# Patient Record
Sex: MALE | URBAN - METROPOLITAN AREA
[De-identification: names, ages, dates, MRNs, and addresses within clinical notes are randomized per-mention and may not be internally consistent; named-entity substitution may affect disease eponyms.]

---

## 2024-04-15 ENCOUNTER — APPOINTMENT (OUTPATIENT)
Dept: PRIMARY CARE | Facility: CLINIC | Age: 29
End: 2024-04-15
Payer: COMMERCIAL

## 2024-05-10 ENCOUNTER — OFFICE VISIT (OUTPATIENT)
Dept: PRIMARY CARE | Facility: CLINIC | Age: 29
End: 2024-05-10
Payer: COMMERCIAL

## 2024-05-10 VITALS
BODY MASS INDEX: 22.44 KG/M2 | OXYGEN SATURATION: 95 % | DIASTOLIC BLOOD PRESSURE: 54 MMHG | HEART RATE: 71 BPM | SYSTOLIC BLOOD PRESSURE: 91 MMHG | WEIGHT: 143 LBS | HEIGHT: 67 IN

## 2024-05-10 DIAGNOSIS — Z00.00 HEALTH CARE MAINTENANCE: Primary | ICD-10-CM

## 2024-05-10 DIAGNOSIS — F41.9 ANXIETY: ICD-10-CM

## 2024-05-10 DIAGNOSIS — R10.10 PAIN OF UPPER ABDOMEN: ICD-10-CM

## 2024-05-10 DIAGNOSIS — R73.9 HYPERGLYCEMIA: ICD-10-CM

## 2024-05-10 PROBLEM — S13.4XXA WHIPLASH INJURY TO NECK: Status: ACTIVE | Noted: 2017-09-25

## 2024-05-10 PROBLEM — K21.9 GERD (GASTROESOPHAGEAL REFLUX DISEASE): Status: ACTIVE | Noted: 2024-04-16

## 2024-05-10 PROCEDURE — 1036F TOBACCO NON-USER: CPT | Performed by: INTERNAL MEDICINE

## 2024-05-10 PROCEDURE — 99385 PREV VISIT NEW AGE 18-39: CPT | Performed by: INTERNAL MEDICINE

## 2024-05-10 RX ORDER — HYDROCORTISONE ACETATE PRAMOXINE HCL 2.5; 1 G/100G; G/100G
1 CREAM TOPICAL 2 TIMES DAILY PRN
COMMUNITY
Start: 2024-04-25

## 2024-05-10 NOTE — ASSESSMENT & PLAN NOTE
Unclear etiology.  Continue workup per gastroenterology.  Consider IBS as the diagnosis.  Consider SSRI in the future.  Check A1c due to hypoglycemia

## 2024-05-10 NOTE — PROGRESS NOTES
"Subjective   Patient ID: Familia Adamson is a 29 y.o. male who presents for [facility].          HPI     Patient is a 29-year-old male with past medical history of anxiety presents to [facility] care.  He has been experiencing some abdominal discomfort as well as diarrhea.  He established with gastroenterology after evaluation in the emergency room.  He had a clean colonoscopy.  He plans to have a HIDA scan upcoming and was recently started on analpram HC for treatment of possible hemorrhoids.  He did have some blood on his stool at some point but the colonoscopy was negative as hemoglobin is within normal range.  He does not have a diagnosis of yet occasionally gets abdominal spasms.    Exercises irregularly.  Tries to eat healthfully.  Family lives nearby but not in the same city    Denies illicit substances smoking.  Alcohol occasionally    Review of Systems  Constitutional: No fever or chills, No Night Sweats  Eyes: No Blurry Vision or Eye sight problems  ENT: No Nasal Discharge, Hoarseness, sore throat  Cardiovascular: no chest pain, no palpitations and no syncope.   Respiratory: no cough, no shortness of breath during exertion and no shortness of breath at rest.   Gastrointestinal: no abdominal pain, no nausea and no vomiting.   : No issues with urinary stream, burning with urination, no blood in urine or stools  Skin: No Skin rashes or Lesions  Neuro: No Headache, no dizziness or Numbness or tingling  Psych: No Anxiety, depression or sleeping problems  Heme: No Easy bleeding or brusing.     Objective   BP 91/54   Pulse 71   Ht 1.702 m (5' 7\")   Wt 64.9 kg (143 lb)   SpO2 95%   BMI 22.40 kg/m²     Physical Exam  Constitutional: Alert and in no acute distress. Well developed, well nourished.   Head and Face: Head and face: Normal.    Eyes: Normal external exam. Pupils were equal in size, round, reactive to light (PERRL) with normal accommodation and extraocular movements intact (EOMI).   Ears, Nose, " "Mouth, and Throat: External inspection of ears and nose: Normal.  Hearing: Normal.  Nasal mucosa, septum, and turbinates: Normal.  Lips, teeth, and gums: Normal.  Oropharynx: Normal.   Neck: No neck mass was observed. Supple. Thyroid not enlarged and there were no palpable thyroid nodules.   Cardiovascular: Heart rate and rhythm were normal, normal S1 and S2. Pedal pulses: Normal. No peripheral edema.   Pulmonary: No respiratory distress. Clear bilateral breath sounds.   Abdomen: Soft nontender; no abdominal mass palpated. Normal bowel sounds. No organomegaly.   : Deferred  Musculoskeletal: No joint swelling seen, normal movements of all extremities. Range of motion: Normal.  Muscle strength/tone: Normal.    Skin: Normal skin color and pigmentation, normal skin turgor, and no rash.   Neurologic: Deep tendon reflexes were 2+ and symmetric.   Psychiatric: Judgment and insight: Intact. Mood and affect: Normal.  Lymphatic: No cervical lymphadenopathy. Palpation of lymph nodes in axillae: Normal.  Palpation of lymph nodes in groin: Normal.    No results found for: \"WBC\", \"HGB\", \"HCT\", \"PLT\", \"CHOL\", \"TRIG\", \"HDL\", \"LDLDIRECT\", \"ALT\", \"AST\", \"NA\", \"K\", \"CL\", \"CREATININE\", \"BUN\", \"CO2\", \"TSH\", \"PSA\", \"INR\", \"GLUF\", \"HGBA1C\", \"ALBUR\"    No image results found.      Assessment/Plan   Problem List Items Addressed This Visit             ICD-10-CM    Anxiety F41.9     Stable off medications however can consider SSRI in the future         Health care maintenance - Primary Z00.00    Pain of upper abdomen R10.10     Unclear etiology.  Continue workup per gastroenterology.  Consider IBS as the diagnosis.  Consider SSRI in the future.  Check A1c due to hypoglycemia            Other Visit Diagnoses         Codes    Hyperglycemia     R73.9    Relevant Orders    Hemoglobin A1C              Dear Familia Adamson     It was my pleasure to take care of you today in the office. Below are the things we discussed today:    1. " Immunizations: Yearly Flu shot is recommended.          Patient to have tetanus at a future date wishes to defer at this time    2. Blood Work: Up-to-date  3. Seen your dentist twice a year  4. Yearly Eye exam is recommended    5. BMI: 22.4  6: Diet recommendations:   Eat Clean, Try to have as many home cooked meals as possible  Avoid processed foods which contain excess calories, sugar, and sodium.    7. Exercise recommendations:   150 minutes a week to maintain your weight     If you have to loose weight, you need a better diet and exercise plan.     8. Please get your Living will / Advance directive completed if you do not have one already. Please make sure our office has a copy of the latest one.     9. Colonoscopy: N/A  10. PSA: N/A    11.     Follow up in one year for a Physical. Please call the office before your Physical to see if you need blood work completed prior to your physical.     Please call me if any questions arise from now until your next visit. I will call you after I am done seeing patients. A Doctor is always available by phone when the office is closed. Please feel free to call for help with any problem that you feel shouldn't wait until the office re-opens.     Shiav Elise, DO

## 2024-05-13 ENCOUNTER — APPOINTMENT (OUTPATIENT)
Dept: GASTROENTEROLOGY | Facility: CLINIC | Age: 29
End: 2024-05-13
Payer: COMMERCIAL

## 2024-08-12 ENCOUNTER — APPOINTMENT (OUTPATIENT)
Dept: PRIMARY CARE | Facility: CLINIC | Age: 29
End: 2024-08-12
Payer: COMMERCIAL

## 2024-08-12 VITALS
DIASTOLIC BLOOD PRESSURE: 74 MMHG | WEIGHT: 145 LBS | BODY MASS INDEX: 22.71 KG/M2 | OXYGEN SATURATION: 98 % | HEART RATE: 74 BPM | SYSTOLIC BLOOD PRESSURE: 124 MMHG

## 2024-08-12 DIAGNOSIS — R10.11 RUQ ABDOMINAL PAIN: Primary | ICD-10-CM

## 2024-08-12 PROCEDURE — 1036F TOBACCO NON-USER: CPT | Performed by: INTERNAL MEDICINE

## 2024-08-12 PROCEDURE — 99213 OFFICE O/P EST LOW 20 MIN: CPT | Performed by: INTERNAL MEDICINE

## 2024-08-12 NOTE — PROGRESS NOTES
"Subjective   Patient ID: Familia Adamson is a 29 y.o. male who presents for Follow-up.          HPI     Patient is a 29-year-old male with past medical history of anxiety presents  for follow up     Last visit patient was noted to have an elevated blood sugar.  An A1c was ordered but not completed.    Patient has been having intermittent right upper quadrant abdominal pain.  He had CT scan of the abdomen and pelvis which was unremarkable.  He has had a GI workup at [facility].  He is supposed to have a HIDA scan but was unable to schedule and he was confused about how to schedule it.  He does have intermittent right upper quadrant abdominal pain not currently present.  He wishes to complete the HIDA scan reviewed note from gastroenterologist at the [facility] that stated    \"He has been experiencing some abdominal discomfort as well as diarrhea.  He established with gastroenterology after evaluation in the emergency room.  He had a clean colonoscopy.  He plans to have a HIDA scan upcoming and was recently started on analpram HC for treatment of possible hemorrhoids.  He did have some blood on his stool at some point but the colonoscopy was negative as hemoglobin is within normal range.  He does not have a diagnosis of yet occasionally gets abdominal spasms.\"      Review of Systems  Constitutional: No fever or chills, No Night Sweats  Eyes: No Blurry Vision or Eye sight problems  ENT: No Nasal Discharge, Hoarseness, sore throat  Cardiovascular: no chest pain, no palpitations and no syncope.   Respiratory: no cough, no shortness of breath during exertion and no shortness of breath at rest.   Gastrointestinal: no abdominal pain, no nausea and no vomiting.   : No issues with urinary stream, burning with urination, no blood in urine or stools  Skin: No Skin rashes or Lesions  Neuro: No Headache, no dizziness or Numbness or tingling  Psych: No Anxiety, depression or sleeping problems  Heme: No Easy " "bleeding or brusing.     Objective   /74   Pulse 74   Wt 65.8 kg (145 lb)   SpO2 98%   BMI 22.71 kg/m²     Physical Exam  Constitutional: Alert and in no acute distress. Well developed, well nourished.   Head and Face: Head and face: Normal.    Eyes: Normal external exam. Pupils were equal in size, round, reactive to light (PERRL) with normal accommodation and extraocular movements intact (EOMI).   Ears, Nose, Mouth, and Throat: External inspection of ears and nose: Normal.  Hearing: Normal.  Nasal mucosa, septum, and turbinates: Normal.  Lips, teeth, and gums: Normal.  Oropharynx: Normal.   Neck: No neck mass was observed. Supple. Thyroid not enlarged and there were no palpable thyroid nodules.   Cardiovascular: Heart rate and rhythm were normal, normal S1 and S2. Pedal pulses: Normal. No peripheral edema.   Pulmonary: No respiratory distress. Clear bilateral breath sounds.   Abdomen: Soft nontender; no abdominal mass palpated. Normal bowel sounds. No organomegaly.   : Deferred  Musculoskeletal: No joint swelling seen, normal movements of all extremities. Range of motion: Normal.  Muscle strength/tone: Normal.    Skin: Normal skin color and pigmentation, normal skin turgor, and no rash.   Neurologic: Deep tendon reflexes were 2+ and symmetric.   Psychiatric: Judgment and insight: Intact. Mood and affect: Normal.  Lymphatic: No cervical lymphadenopathy. Palpation of lymph nodes in axillae: Normal.  Palpation of lymph nodes in groin: Normal.    No results found for: \"WBC\", \"HGB\", \"HCT\", \"PLT\", \"CHOL\", \"TRIG\", \"HDL\", \"LDLDIRECT\", \"ALT\", \"AST\", \"NA\", \"K\", \"CL\", \"CREATININE\", \"BUN\", \"CO2\", \"TSH\", \"PSA\", \"INR\", \"GLUF\", \"HGBA1C\", \"ALBUR\"    No image results found.      Assessment/Plan   Problem List Items Addressed This Visit    None  Visit Diagnoses         Codes    RUQ abdominal pain    -  Primary R10.11    Relevant Orders    NM hepatobiliary w cholecystokinin        Please complete your A1c to rule out " diabetes.  Will also order the HIDA scan at the recommendation of the gastroenterologist.  If positive advised follow-up with a gastroenterologist for evaluation and treatment

## 2024-09-18 ENCOUNTER — HOSPITAL ENCOUNTER (OUTPATIENT)
Dept: RADIOLOGY | Facility: CLINIC | Age: 29
Discharge: HOME | End: 2024-09-18
Payer: COMMERCIAL

## 2024-09-18 ENCOUNTER — OFFICE VISIT (OUTPATIENT)
Dept: PRIMARY CARE | Facility: CLINIC | Age: 29
End: 2024-09-18
Payer: COMMERCIAL

## 2024-09-18 VITALS
BODY MASS INDEX: 22.4 KG/M2 | WEIGHT: 143 LBS | OXYGEN SATURATION: 98 % | HEART RATE: 79 BPM | DIASTOLIC BLOOD PRESSURE: 71 MMHG | SYSTOLIC BLOOD PRESSURE: 118 MMHG

## 2024-09-18 DIAGNOSIS — M54.2 NECK PAIN: ICD-10-CM

## 2024-09-18 DIAGNOSIS — M54.50 LOW BACK PAIN, UNSPECIFIED BACK PAIN LATERALITY, UNSPECIFIED CHRONICITY, UNSPECIFIED WHETHER SCIATICA PRESENT: ICD-10-CM

## 2024-09-18 DIAGNOSIS — M54.2 NECK PAIN: Primary | ICD-10-CM

## 2024-09-18 PROCEDURE — 99213 OFFICE O/P EST LOW 20 MIN: CPT | Performed by: INTERNAL MEDICINE

## 2024-09-18 PROCEDURE — 1036F TOBACCO NON-USER: CPT | Performed by: INTERNAL MEDICINE

## 2024-09-18 PROCEDURE — 72050 X-RAY EXAM NECK SPINE 4/5VWS: CPT

## 2024-09-18 PROCEDURE — 72050 X-RAY EXAM NECK SPINE 4/5VWS: CPT | Performed by: RADIOLOGY

## 2024-09-18 RX ORDER — METHOCARBAMOL 500 MG/1
500 TABLET, FILM COATED ORAL 3 TIMES DAILY PRN
Qty: 21 TABLET | Refills: 0 | Status: SHIPPED | OUTPATIENT
Start: 2024-09-18 | End: 2024-09-25
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
## 2024-09-25 ENCOUNTER — HOSPITAL ENCOUNTER (OUTPATIENT)
Dept: RADIOLOGY | Facility: EXTERNAL LOCATION | Age: 29
Discharge: HOME | End: 2024-09-25

## 2024-09-25 DIAGNOSIS — M54.2 NECK PAIN: ICD-10-CM

## 2024-10-02 ENCOUNTER — HOSPITAL ENCOUNTER (OUTPATIENT)
Dept: RADIOLOGY | Facility: EXTERNAL LOCATION | Age: 29
Discharge: HOME | End: 2024-10-02

## 2024-10-02 DIAGNOSIS — R51.9 NONINTRACTABLE HEADACHE, UNSPECIFIED CHRONICITY PATTERN, UNSPECIFIED HEADACHE TYPE: ICD-10-CM

## 2024-10-24 PROBLEM — R51.9 HEADACHE: Status: ACTIVE | Noted: 2024-10-24

## 2024-10-24 NOTE — PROGRESS NOTES
"Physical Therapy    Physical Therapy Evaluation and Treatment      Patient Name: Familia Adamson \"Stefanie"  MRN: [id number]  Today's Date: 10/25/24  Visit 1  Time Entry:   Time Calculation  Start Time: 0945  Stop Time: 1030  Time Calculation (min): 45 min  PT Evaluation Time Entry  PT Evaluation (Low) Time Entry: 20  PT Therapeutic Procedures Time Entry  Manual Therapy Time Entry: 15  Therapeutic Exercise Time Entry: 10                   Assessment:    Patient presents with clinical signs and symptoms consistent with cervical strain with R UE radicular symptom in the  C 6  distribution. These impairments affect ADLs, work, recreational activity, exercise, transfer ability, and sleep function that requires skilled PT intervention to resolve and enable patient to return to previous level of function. Factors that may affect progress in PT is and patient compliance.  Patient response to initial treatment of  Strain counter strain technique to reduce muscle hypertonus and UQ stretching was understood by Michael Adamson.          Plan:  OP PT Plan  Treatment/Interventions: Dry needling, Cryotherapy, Education/ Instruction, Manual therapy, Neuromuscular re-education, Self care/ home management, Taping techniques, Therapeutic exercises, Therapeutic activities  PT Plan: Skilled PT  PT Frequency: 1 time per week  Duration: 8  Onset Date: 08/01/24  Certification Period Start Date: 10/25/24  Certification Period End Date: 01/23/25  Rehab Potential: Excellent  Plan of Care Agreement: Patient    Current Problem:   1. Headache  Follow Up In Physical Therapy      2. Neck pain  Referral to Physical Therapy    Follow Up In Physical Therapy      3. Low back pain, unspecified back pain laterality, unspecified chronicity, unspecified whether sciatica present  Referral to Physical Therapy          Subjective    Michael Adamson c/o cervical pain and R UE radicular pain . He has had HA s in past not lately. Preferred mode of exercise walking "        Pain:   4-5/10  Home Living:     Prior Level of Function:   Works as  [employer]  Objective          Observation:    Shoulder joint clearance: clear    Cervical AROM : Flex 100  %    , Extension  100  % P central ,   Sidebending  R 90 % P  L  90% P  , Rotation  R  70 Pdeg  L  80deg  Rotation R Peripheralizes         Myotomal Strength:  C4   R 5/5   L 5/5,  C5  R  5/5  L  5/5, C6  R  3+/5  L 5 /5,  C7   R  /5  L  /5  C8   R  /5  L  /5  T1   R  /5  L  /5            Sensation: R C6 distribution dorsal forearm    Segmental cervical  joint mobility: clear  Palpation:  upper quarter hypertonus including R cervical paraspinals,  R levator scapulae,  UT , anterior scalene,  R pec minor,  ,                     R rectus capitus,                          Special Tests:   Spurling's      -  ,  Vertical compression    -,  Distraction  centralizes                                  Treatments:  There ex:  - Gentle Levator Scapulae Stretch  - 3 x daily - 7 x weekly - 30 reps  - Doorway Pec Stretch at 90 Degrees Abduction  - 3 x daily - 7 x weekly - 3 reps - 30 hold  Manual:  Strain counter strain technique to reduce muscle hypertonus of R rectus capitus, R levator, and R pec minor  EDUCATION:     extensive education regarding mechanism of injury, relevant functional anatomy, treatment program rational, self management, HEP, and POC   Access Code: [email or web address]  URL: [email or web address]  Date: 10/25/2024  Prepared by: Leo Melvin    Exercises  - Gentle Levator Scapulae Stretch  - 3 x daily - 7 x weekly - 30 reps  - Doorway Pec Stretch at 90 Degrees Abduction  - 3 x daily - 7 x weekly - 3 reps - 30 hold  Goals:  1. Centralize R UE symptom  2. Full pain free AROM cervical spine  3. Reduce R UQ TP pain severity and hypertonus  4. Michael Adamson will demonstrate ability to press 20 lbs over head R and L arm and carry 30 lbs 30 feet without aggravating familiar pain  5. independent Home exercise  program

## 2024-10-25 ENCOUNTER — EVALUATION (OUTPATIENT)
Dept: PHYSICAL THERAPY | Facility: CLINIC | Age: 29
End: 2024-10-25
Payer: COMMERCIAL

## 2024-10-25 DIAGNOSIS — M54.50 LOW BACK PAIN, UNSPECIFIED BACK PAIN LATERALITY, UNSPECIFIED CHRONICITY, UNSPECIFIED WHETHER SCIATICA PRESENT: ICD-10-CM

## 2024-10-25 DIAGNOSIS — R51.9 HEADACHE: Primary | ICD-10-CM

## 2024-10-25 DIAGNOSIS — M54.2 NECK PAIN: ICD-10-CM

## 2024-10-25 PROCEDURE — 97110 THERAPEUTIC EXERCISES: CPT | Mod: GP | Performed by: PHYSICAL THERAPIST

## 2024-10-25 PROCEDURE — 97140 MANUAL THERAPY 1/> REGIONS: CPT | Mod: GP | Performed by: PHYSICAL THERAPIST

## 2024-10-25 PROCEDURE — 97161 PT EVAL LOW COMPLEX 20 MIN: CPT | Mod: GP | Performed by: PHYSICAL THERAPIST

## 2024-11-14 PROBLEM — M54.2 NECK PAIN: Status: ACTIVE | Noted: 2024-11-14

## 2024-11-14 PROBLEM — M54.50 LOW BACK PAIN: Status: ACTIVE | Noted: 2024-11-14

## 2024-11-14 NOTE — PROGRESS NOTES
"Physical Therapy    Physical Therapy Evaluation and Treatment      Patient Name: Familia Adamson \"Stefanie"  MRN: [id number]  Today's Date: 11/15/24  Visit 2  Time Entry:   Time Calculation  Start Time: 0900  Stop Time: 0940  Time Calculation (min): 40 min     PT Therapeutic Procedures Time Entry  Manual Therapy Time Entry: 25  Therapeutic Exercise Time Entry: 15                   Assessment:  Michael Adamson reported significant L UQ pain and muscle tone reduction post treatment. He is modifying his weight training program to minimize over head pressing and any exercise that aggravates his pain.        Plan:   Michael Adamson will bring his fiancee in to learn Strain counter strain technique to reduce muscle hypertonus     Current Problem:   Problem List Items Addressed This Visit             ICD-10-CM    Episodic tension-type headache, not intractable - Primary G44.219    Neck pain M54.2    Low back pain M54.50        Subjective    Michael Adamson notes that his neck pain and R UE radicular symptom is less frequent and less severe     Pain:   5/10  Home Living:     Prior Level of Function:   Works as  [employer]  Objective          Observation:    + L UQ Tps as at initial visit                               Treatments:  There ex:  - Gentle Levator Scapulae Stretch  - 3 x daily - 7 x weekly - 30 reps  - Doorway Pec Stretch at 90 Degrees Abduction  - 3 x daily - 7 x weekly - 3 reps - 30 hold  - Seated Assisted Cervical Rotation with Towel  - 1 x daily - 7 x weekly - 3 sets - 10 reps - 30 hold  - Mid-Lower Cervical Extension SNAG with Strap  - 1 x daily - 7 x weekly - 3 sets - 10 reps - 30 hold  Manual:  Strain counter strain technique to reduce muscle hypertonus of R UT  rectus capitus, R levator, anterior scalene, and R pec minor  MET cervical Sbing L at C5-6    EDUCATION:     Access Code: [email or web address]  URL: [email or web address]  Date: 11/15/2024  Prepared by: Leo Phelps   " Seated Assisted Cervical Rotation with Towel  - 1 x daily - 7 x weekly - 3 sets - 10 reps - 30 hold  - Mid-Lower Cervical Extension SNAG with Strap  - 1 x daily - 7 x weekly - 3 sets - 10 reps - 30 hold  Goals:  1. Centralize R UE symptom  2. Full pain free AROM cervical spine  3. Reduce R UQ TP pain severity and hypertonus  4. Michael Adamson will demonstrate ability to press 20 lbs over head R and L arm and carry 30 lbs 30 feet without aggravating familiar pain  5. independent Home exercise program

## 2024-11-15 ENCOUNTER — TREATMENT (OUTPATIENT)
Dept: PHYSICAL THERAPY | Facility: CLINIC | Age: 29
End: 2024-11-15
Payer: COMMERCIAL

## 2024-11-15 DIAGNOSIS — M54.2 NECK PAIN: ICD-10-CM

## 2024-11-15 DIAGNOSIS — G44.219 EPISODIC TENSION-TYPE HEADACHE, NOT INTRACTABLE: ICD-10-CM

## 2024-11-15 DIAGNOSIS — R51.9 HEADACHE: Primary | ICD-10-CM

## 2024-11-15 DIAGNOSIS — M54.50 LOW BACK PAIN, UNSPECIFIED BACK PAIN LATERALITY, UNSPECIFIED CHRONICITY, UNSPECIFIED WHETHER SCIATICA PRESENT: ICD-10-CM

## 2024-11-15 PROCEDURE — 97140 MANUAL THERAPY 1/> REGIONS: CPT | Mod: GP | Performed by: PHYSICAL THERAPIST

## 2024-11-15 PROCEDURE — 97110 THERAPEUTIC EXERCISES: CPT | Mod: GP | Performed by: PHYSICAL THERAPIST

## 2024-11-21 ENCOUNTER — TREATMENT (OUTPATIENT)
Dept: PHYSICAL THERAPY | Facility: CLINIC | Age: 29
End: 2024-11-21
Payer: COMMERCIAL

## 2024-11-21 DIAGNOSIS — M54.50 LOW BACK PAIN, UNSPECIFIED BACK PAIN LATERALITY, UNSPECIFIED CHRONICITY, UNSPECIFIED WHETHER SCIATICA PRESENT: ICD-10-CM

## 2024-11-21 DIAGNOSIS — M54.2 NECK PAIN: Primary | ICD-10-CM

## 2024-11-21 DIAGNOSIS — G44.219 EPISODIC TENSION-TYPE HEADACHE, NOT INTRACTABLE: ICD-10-CM

## 2024-11-21 NOTE — PROGRESS NOTES
"Physical Therapy                 Therapy Communication Note    Patient Name: Familia Adamson \"Michael\"  MRN: [id number]  Department:   Room: Room/bed info not found  Today's Date: 11/21/2024     Discipline: Physical Therapy    Missed Visit Reason:      Missed Time: No Show    Comment:  "

## 2024-11-29 PROBLEM — R51.9 HEADACHE: Status: ACTIVE | Noted: 2024-11-29

## 2024-11-29 NOTE — PROGRESS NOTES
"Physical Therapy    Physical Therapy  Treatment      Patient Name: Familia Adamson \"Michael\"  MRN: [id number]  Today's Date: 12/2/24  Visit 3  Time Entry:   Time Calculation  Start Time: 0930  Stop Time: 1010  Time Calculation (min): 40 min     PT Therapeutic Procedures Time Entry  Manual Therapy Time Entry: 25  Therapeutic Exercise Time Entry: 15                   Assessment:  Michael Adamson has no more HA, his neck pain is less severe, but he does still have anterior R shoulder pain and occasional bilat UE distal radicular symptom L > R  He has deep neck flexor weakness so we addressed that with a strengthening exercise      Plan:  Continue to work on resolving UQ hypertonus    Current Problem:   Problem List Items Addressed This Visit             ICD-10-CM    Episodic tension-type headache, not intractable - Primary G44.219    Neck pain M54.2    Low back pain M54.50    Headache R51.9        Subjective    Michael Adamson  feels that his neck is better, but now his anterior L shoulder pain is the most prominent pain. UE radicular symptom  is resolving  Pain:   5/10         Objective          Observation:    + L UQ Tps as at initial visit                               Treatments:  There ex:   Deep neck flexor strengthening 10x 10 sec holds  - Gentle Levator Scapulae Stretch  - 3 x daily - 7 x weekly - 30 reps  - Doorway Pec Stretch at 90 Degrees Abduction  - 3 x daily - 7 x weekly - 3 reps - 30 hold  - Seated Assisted Cervical Rotation with Towel  - 1 x daily - 7 x weekly - 3 sets - 10 reps - 30 hold  - Mid-Lower Cervical Extension SNAG with Strap  - 1 x daily - 7 x weekly - 3 sets - 10 reps - 30 hold  Manual:  Strain counter strain technique to reduce muscle hypertonus of bilat UT  rectus capitus,  levator, anterior scalene, and bilat pec minor      EDUCATION:       Goals:Access Code: [email or web address]  URL: [email or web address]  Date: 12/02/2024  Prepared by: Leo Melvin    Exercises  - Supine Head Nod " with Deep Neck Flexor Activation  - 1 x daily - 7 x weekly - 3 sets - 10 reps - 10 hold  1. Centralize R UE symptom  2. Full pain free AROM cervical spine  3. Reduce R UQ TP pain severity and hypertonus  4. Michael Adamson will demonstrate ability to press 20 lbs over head R and L arm and carry 30 lbs 30 feet without aggravating familiar pain  5. independent Home exercise program

## 2024-12-02 ENCOUNTER — TREATMENT (OUTPATIENT)
Dept: PHYSICAL THERAPY | Facility: CLINIC | Age: 29
End: 2024-12-02
Payer: COMMERCIAL

## 2024-12-02 DIAGNOSIS — G44.219 EPISODIC TENSION-TYPE HEADACHE, NOT INTRACTABLE: Primary | ICD-10-CM

## 2024-12-02 DIAGNOSIS — M54.2 NECK PAIN: ICD-10-CM

## 2024-12-02 DIAGNOSIS — R51.9 HEADACHE: ICD-10-CM

## 2024-12-02 DIAGNOSIS — M54.50 LOW BACK PAIN, UNSPECIFIED BACK PAIN LATERALITY, UNSPECIFIED CHRONICITY, UNSPECIFIED WHETHER SCIATICA PRESENT: ICD-10-CM

## 2024-12-02 PROCEDURE — 97110 THERAPEUTIC EXERCISES: CPT | Mod: GP | Performed by: PHYSICAL THERAPIST

## 2024-12-02 PROCEDURE — 97140 MANUAL THERAPY 1/> REGIONS: CPT | Mod: GP | Performed by: PHYSICAL THERAPIST

## 2024-12-12 ENCOUNTER — TREATMENT (OUTPATIENT)
Dept: PHYSICAL THERAPY | Facility: CLINIC | Age: 29
End: 2024-12-12
Payer: COMMERCIAL

## 2024-12-12 DIAGNOSIS — M54.2 NECK PAIN: ICD-10-CM

## 2024-12-12 DIAGNOSIS — R51.9 HEADACHE: Primary | ICD-10-CM

## 2024-12-12 NOTE — PROGRESS NOTES
"Physical Therapy                 Therapy Communication Note    Patient Name: Familia Adamson \"Michael\"  MRN: [id number]  Department:   Room: Room/bed info not found  Today's Date: 12/12/2024     Discipline: Physical Therapy    Missed Visit Reason:      Missed Time: Cancel    Comment:  "

## 2024-12-31 ENCOUNTER — TREATMENT (OUTPATIENT)
Dept: PHYSICAL THERAPY | Facility: CLINIC | Age: 29
End: 2024-12-31
Payer: COMMERCIAL

## 2024-12-31 DIAGNOSIS — R51.9 HEADACHE: Primary | ICD-10-CM

## 2024-12-31 DIAGNOSIS — M54.50 LOW BACK PAIN, UNSPECIFIED BACK PAIN LATERALITY, UNSPECIFIED CHRONICITY, UNSPECIFIED WHETHER SCIATICA PRESENT: ICD-10-CM

## 2024-12-31 DIAGNOSIS — G44.219 EPISODIC TENSION-TYPE HEADACHE, NOT INTRACTABLE: ICD-10-CM

## 2024-12-31 DIAGNOSIS — M54.2 NECK PAIN: ICD-10-CM

## 2024-12-31 PROCEDURE — 97140 MANUAL THERAPY 1/> REGIONS: CPT | Mod: GP | Performed by: PHYSICAL THERAPIST

## 2024-12-31 PROCEDURE — 97110 THERAPEUTIC EXERCISES: CPT | Mod: GP | Performed by: PHYSICAL THERAPIST

## 2024-12-31 NOTE — PROGRESS NOTES
"Physical Therapy    Physical Therapy  Treatment      Patient Name: Familia Adamson \"Michael\"  MRN: [id number]  Today's Date: 12/31/24  Visit 4  Time Entry:   Time Calculation  Start Time: 1445  Stop Time: 1530  Time Calculation (min): 45 min     PT Therapeutic Procedures Time Entry  Manual Therapy Time Entry: 20  Therapeutic Exercise Time Entry: 25                   Assessment:  Michael Adamson is responsive to Strain counter strain technique to reduce muscle hypertonus but still has severe bilat UQ Tps. He automatically moves into UT hypertonus upon handling any front load which perpetuates his problem    Plan:   Progress to fundamental movement mobility and strength program to minimize return of familiar symptom  Current Problem:   Problem List Items Addressed This Visit             ICD-10-CM    Episodic tension-type headache, not intractable G44.219    Neck pain M54.2    Low back pain M54.50    Headache - Primary R51.9        Subjective    Michael Adamson  feels that his neck is better, . UE radicular symptom   comes and goes. Works still aggfavtes  Pain:   4/10         Objective   Tends to shrug scapulae when handling KB loads in rack and with dead lift       Observation:    + L UQ Tps as at initial visit      Pec minor R > L                          Treatments:  There ex:   KB dead lift 20 lbs much cuing for form 3 x10  Kb rack walk with 20 lb 4 x  Manual:  Strain counter strain technique to reduce muscle hypertonus of bilat UT  rectus capitus,  levator, anterior scalene, and bilat pec minor      EDUCATION:   KB walks and DL for total body tension development and UQ motor control during lifting and carrying tasks      1. Centralize R UE symptom  2. Full pain free AROM cervical spineA  3. Reduce R UQ TP pain severity and hypertonus  4. Michael Adamson will demonstrate ability to press 20 lbs over head R and L arm and carry 30 lbs 30 feet without aggravating familiar pain  5. independent Home exercise program         "

## 2025-06-26 ENCOUNTER — DOCUMENTATION (OUTPATIENT)
Dept: PHYSICAL THERAPY | Facility: CLINIC | Age: 30
End: 2025-06-26
Payer: COMMERCIAL

## 2025-06-26 NOTE — PROGRESS NOTES
"Physical Therapy    Discharge Summary    Name: Familia Adamson \"Michael\"  MRN: [id number]  : 1995  Date: 25    Discharge Summary: PT    Discharge Information: Date of discharge 25, Date of last visit 24, Date of evaluation 10/25/24, and Number of attended visits 6    Therapy Summary: see last PT progress note    Discharge Status: unknown     Rehab Discharge Reason: Failed to schedule and/or keep follow-up appointment(s)  "